# Patient Record
Sex: MALE | Race: ASIAN | NOT HISPANIC OR LATINO
[De-identification: names, ages, dates, MRNs, and addresses within clinical notes are randomized per-mention and may not be internally consistent; named-entity substitution may affect disease eponyms.]

---

## 2017-01-23 PROBLEM — Z00.00 ENCOUNTER FOR PREVENTIVE HEALTH EXAMINATION: Status: ACTIVE | Noted: 2017-01-23

## 2017-01-25 ENCOUNTER — APPOINTMENT (OUTPATIENT)
Age: 52
End: 2017-01-25

## 2017-01-25 DIAGNOSIS — N18.6 END STAGE RENAL DISEASE: ICD-10-CM

## 2017-02-04 ENCOUNTER — INPATIENT (INPATIENT)
Facility: HOSPITAL | Age: 52
LOS: 3 days | Discharge: HOME | DRG: 673 | End: 2017-02-08
Attending: INTERNAL MEDICINE | Admitting: INTERNAL MEDICINE
Payer: MEDICAID

## 2017-02-04 VITALS
TEMPERATURE: 99 F | OXYGEN SATURATION: 100 % | DIASTOLIC BLOOD PRESSURE: 68 MMHG | WEIGHT: 154.98 LBS | HEART RATE: 88 BPM | HEIGHT: 71 IN | RESPIRATION RATE: 20 BRPM | SYSTOLIC BLOOD PRESSURE: 129 MMHG

## 2017-02-04 DIAGNOSIS — D64.9 ANEMIA, UNSPECIFIED: ICD-10-CM

## 2017-02-04 LAB
ALBUMIN SERPL ELPH-MCNC: 3.4 G/DL — LOW (ref 3.5–5)
ALP SERPL-CCNC: 118 U/L — SIGNIFICANT CHANGE UP (ref 40–120)
ALT FLD-CCNC: 19 U/L DA — SIGNIFICANT CHANGE UP (ref 10–60)
ANION GAP SERPL CALC-SCNC: 7 MMOL/L — SIGNIFICANT CHANGE UP (ref 5–17)
APTT BLD: 31.9 SEC — SIGNIFICANT CHANGE UP (ref 27.5–37.4)
AST SERPL-CCNC: 15 U/L — SIGNIFICANT CHANGE UP (ref 10–40)
BASOPHILS # BLD AUTO: 0.1 K/UL — SIGNIFICANT CHANGE UP (ref 0–0.2)
BASOPHILS NFR BLD AUTO: 1 % — SIGNIFICANT CHANGE UP (ref 0–2)
BILIRUB SERPL-MCNC: 0.6 MG/DL — SIGNIFICANT CHANGE UP (ref 0.2–1.2)
BLD GP AB SCN SERPL QL: SIGNIFICANT CHANGE UP
BUN SERPL-MCNC: 31 MG/DL — HIGH (ref 7–18)
CALCIUM SERPL-MCNC: 8.6 MG/DL — SIGNIFICANT CHANGE UP (ref 8.4–10.5)
CHLORIDE SERPL-SCNC: 97 MMOL/L — SIGNIFICANT CHANGE UP (ref 96–108)
CO2 SERPL-SCNC: 34 MMOL/L — HIGH (ref 22–31)
CREAT SERPL-MCNC: 4.62 MG/DL — HIGH (ref 0.5–1.3)
EOSINOPHIL # BLD AUTO: 0.6 K/UL — HIGH (ref 0–0.5)
EOSINOPHIL NFR BLD AUTO: 6.7 % — HIGH (ref 0–6)
GLUCOSE SERPL-MCNC: 149 MG/DL — HIGH (ref 70–99)
HCT VFR BLD CALC: 23.8 % — LOW (ref 39–50)
HGB BLD-MCNC: 7.7 G/DL — LOW (ref 13–17)
INR BLD: 1.05 RATIO — SIGNIFICANT CHANGE UP (ref 0.88–1.16)
LYMPHOCYTES # BLD AUTO: 2.1 K/UL — SIGNIFICANT CHANGE UP (ref 1–3.3)
LYMPHOCYTES # BLD AUTO: 24.1 % — SIGNIFICANT CHANGE UP (ref 13–44)
MCHC RBC-ENTMCNC: 30 PG — SIGNIFICANT CHANGE UP (ref 27–34)
MCHC RBC-ENTMCNC: 32.2 GM/DL — SIGNIFICANT CHANGE UP (ref 32–36)
MCV RBC AUTO: 93.1 FL — SIGNIFICANT CHANGE UP (ref 80–100)
MONOCYTES # BLD AUTO: 0.9 K/UL — SIGNIFICANT CHANGE UP (ref 0–0.9)
MONOCYTES NFR BLD AUTO: 10.8 % — SIGNIFICANT CHANGE UP (ref 2–14)
NEUTROPHILS # BLD AUTO: 4.9 K/UL — SIGNIFICANT CHANGE UP (ref 1.8–7.4)
NEUTROPHILS NFR BLD AUTO: 57.5 % — SIGNIFICANT CHANGE UP (ref 43–77)
OB PNL STL: POSITIVE
PLATELET # BLD AUTO: 214 K/UL — SIGNIFICANT CHANGE UP (ref 150–400)
POTASSIUM SERPL-MCNC: 4.9 MMOL/L — SIGNIFICANT CHANGE UP (ref 3.5–5.3)
POTASSIUM SERPL-SCNC: 4.9 MMOL/L — SIGNIFICANT CHANGE UP (ref 3.5–5.3)
PROT SERPL-MCNC: 7.1 G/DL — SIGNIFICANT CHANGE UP (ref 6–8.3)
PROTHROM AB SERPL-ACNC: 11.8 SEC — SIGNIFICANT CHANGE UP (ref 10–13.1)
RBC # BLD: 2.55 M/UL — LOW (ref 4.2–5.8)
RBC # FLD: 16.8 % — HIGH (ref 10.3–14.5)
SODIUM SERPL-SCNC: 138 MMOL/L — SIGNIFICANT CHANGE UP (ref 135–145)
WBC # BLD: 8.5 K/UL — SIGNIFICANT CHANGE UP (ref 3.8–10.5)
WBC # FLD AUTO: 8.5 K/UL — SIGNIFICANT CHANGE UP (ref 3.8–10.5)

## 2017-02-04 PROCEDURE — 99285 EMERGENCY DEPT VISIT HI MDM: CPT

## 2017-02-04 PROCEDURE — 71020: CPT | Mod: 26

## 2017-02-04 RX ORDER — SODIUM CHLORIDE 9 MG/ML
3 INJECTION INTRAMUSCULAR; INTRAVENOUS; SUBCUTANEOUS ONCE
Qty: 0 | Refills: 0 | Status: COMPLETED | OUTPATIENT
Start: 2017-02-04 | End: 2017-02-04

## 2017-02-04 RX ADMIN — SODIUM CHLORIDE 3 MILLILITER(S): 9 INJECTION INTRAMUSCULAR; INTRAVENOUS; SUBCUTANEOUS at 17:59

## 2017-02-04 NOTE — ED PROVIDER NOTE - CHPI ED SYMPTOMS POS
NAUSEA/PALPITATIONS/DIZZINESS/weakness, lightheadedness, weakness, abd pain, black stool/SHORTNESS OF BREATH

## 2017-02-04 NOTE — ED PROVIDER NOTE - NS ED MD SCRIBE ATTENDING SCRIBE SECTIONS
PAST MEDICAL/SURGICAL/SOCIAL HISTORY/HISTORY OF PRESENT ILLNESS/REVIEW OF SYSTEMS/PHYSICAL EXAM/VITAL SIGNS( Pullset)/DISPOSITION/HIV

## 2017-02-04 NOTE — ED ADULT TRIAGE NOTE - CHIEF COMPLAINT QUOTE
c/o  shortness of breath/dizziness/chest pain x 3- 4 days last dialysis today states the perma cath is hurting too bad

## 2017-02-04 NOTE — ED ADULT NURSE NOTE - OBJECTIVE STATEMENT
PT P/W SOB AND C/P X 4 DAYS -- PATIENT HAD HD TODAY PT P/W INTERMITTENT SOB AND C/P X 4 DAYS -- PATIENT HAD HD TODAY

## 2017-02-04 NOTE — ED PROVIDER NOTE - OBJECTIVE STATEMENT
52 y/o M pt w/ PMHx of ESRD, on HD ([facility]) HD 50 y/o M pt w/ PMHx of anemia, ESRD, on HD (TuThrSat) last HD today 3hours/4hours sent in from [facility] dialysis center for weakness onset today. Pt reports palpitation, lightheadedness, nausea, SOB, mild abd pain and black stool for 3-4 weeks. Pt reports he has been feeling weak for the 3-4 days, w/ the associated sx. Pt additionally reports his BP has also have been fluctuating today while getting dialysis, dropping it to 82/26. Pt was not longer able to continue dialysis today. Pt also states that he had workup done few days ago which was significant for low H&H. Pt denies CP, fever, chills, or any other complaints. NKDA.

## 2017-02-04 NOTE — ED PROVIDER NOTE - MEDICAL DECISION MAKING DETAILS
50 y/o M pt on dialysis, sent by dialysis center for low RBC count. Symptomatic anemia. Will get blood work, likely admit if need for transfusion.

## 2017-02-05 DIAGNOSIS — I25.10 ATHEROSCLEROTIC HEART DISEASE OF NATIVE CORONARY ARTERY WITHOUT ANGINA PECTORIS: ICD-10-CM

## 2017-02-05 DIAGNOSIS — I10 ESSENTIAL (PRIMARY) HYPERTENSION: ICD-10-CM

## 2017-02-05 DIAGNOSIS — Z29.9 ENCOUNTER FOR PROPHYLACTIC MEASURES, UNSPECIFIED: ICD-10-CM

## 2017-02-05 DIAGNOSIS — D64.9 ANEMIA, UNSPECIFIED: ICD-10-CM

## 2017-02-05 DIAGNOSIS — N18.6 END STAGE RENAL DISEASE: ICD-10-CM

## 2017-02-05 LAB
CK MB BLD-MCNC: 2.9 % — SIGNIFICANT CHANGE UP (ref 0–3.5)
CK MB CFR SERPL CALC: 1.6 NG/ML — SIGNIFICANT CHANGE UP (ref 0–3.6)
CK SERPL-CCNC: 56 U/L — SIGNIFICANT CHANGE UP (ref 35–232)
HCT VFR BLD CALC: 31.9 % — LOW (ref 39–50)
HGB BLD-MCNC: 10.6 G/DL — LOW (ref 13–17)
MCHC RBC-ENTMCNC: 30.4 PG — SIGNIFICANT CHANGE UP (ref 27–34)
MCHC RBC-ENTMCNC: 33 GM/DL — SIGNIFICANT CHANGE UP (ref 32–36)
MCV RBC AUTO: 92 FL — SIGNIFICANT CHANGE UP (ref 80–100)
OB PNL STL: NEGATIVE — SIGNIFICANT CHANGE UP
PLATELET # BLD AUTO: 208 K/UL — SIGNIFICANT CHANGE UP (ref 150–400)
RBC # BLD: 3.47 M/UL — LOW (ref 4.2–5.8)
RBC # FLD: 16 % — HIGH (ref 10.3–14.5)
TROPONIN I SERPL-MCNC: 0.02 NG/ML — SIGNIFICANT CHANGE UP (ref 0–0.04)
WBC # BLD: 8 K/UL — SIGNIFICANT CHANGE UP (ref 3.8–10.5)
WBC # FLD AUTO: 8 K/UL — SIGNIFICANT CHANGE UP (ref 3.8–10.5)

## 2017-02-05 RX ORDER — DOCUSATE SODIUM 100 MG
100 CAPSULE ORAL
Qty: 0 | Refills: 0 | Status: DISCONTINUED | OUTPATIENT
Start: 2017-02-05 | End: 2017-02-08

## 2017-02-05 RX ORDER — ISOSORBIDE MONONITRATE 60 MG/1
30 TABLET, EXTENDED RELEASE ORAL DAILY
Qty: 0 | Refills: 0 | Status: DISCONTINUED | OUTPATIENT
Start: 2017-02-05 | End: 2017-02-07

## 2017-02-05 RX ORDER — LABETALOL HCL 100 MG
200 TABLET ORAL
Qty: 0 | Refills: 0 | Status: DISCONTINUED | OUTPATIENT
Start: 2017-02-05 | End: 2017-02-07

## 2017-02-05 RX ORDER — LABETALOL HCL 100 MG
200 TABLET ORAL
Qty: 0 | Refills: 0 | Status: DISCONTINUED | OUTPATIENT
Start: 2017-02-05 | End: 2017-02-05

## 2017-02-05 RX ORDER — NIFEDIPINE 30 MG
60 TABLET, EXTENDED RELEASE 24 HR ORAL ONCE
Qty: 0 | Refills: 0 | Status: COMPLETED | OUTPATIENT
Start: 2017-02-05 | End: 2017-02-05

## 2017-02-05 RX ORDER — ASPIRIN/CALCIUM CARB/MAGNESIUM 324 MG
81 TABLET ORAL DAILY
Qty: 0 | Refills: 0 | Status: DISCONTINUED | OUTPATIENT
Start: 2017-02-05 | End: 2017-02-08

## 2017-02-05 RX ORDER — LOSARTAN POTASSIUM 100 MG/1
50 TABLET, FILM COATED ORAL DAILY
Qty: 0 | Refills: 0 | Status: DISCONTINUED | OUTPATIENT
Start: 2017-02-05 | End: 2017-02-07

## 2017-02-05 RX ORDER — DOCUSATE SODIUM 100 MG
1 CAPSULE ORAL
Qty: 0 | Refills: 0 | COMMUNITY

## 2017-02-05 RX ORDER — AMLODIPINE BESYLATE 2.5 MG/1
10 TABLET ORAL DAILY
Qty: 0 | Refills: 0 | Status: DISCONTINUED | OUTPATIENT
Start: 2017-02-05 | End: 2017-02-05

## 2017-02-05 RX ORDER — LABETALOL HCL 100 MG
10 TABLET ORAL ONCE
Qty: 0 | Refills: 0 | Status: COMPLETED | OUTPATIENT
Start: 2017-02-05 | End: 2017-02-05

## 2017-02-05 RX ORDER — ERYTHROPOIETIN 10000 [IU]/ML
4000 INJECTION, SOLUTION INTRAVENOUS; SUBCUTANEOUS EVERY OTHER DAY
Qty: 0 | Refills: 0 | Status: DISCONTINUED | OUTPATIENT
Start: 2017-02-05 | End: 2017-02-05

## 2017-02-05 RX ORDER — ERYTHROPOIETIN 10000 [IU]/ML
4000 INJECTION, SOLUTION INTRAVENOUS; SUBCUTANEOUS
Qty: 0 | Refills: 0 | Status: DISCONTINUED | OUTPATIENT
Start: 2017-02-05 | End: 2017-02-06

## 2017-02-05 RX ORDER — ATORVASTATIN CALCIUM 80 MG/1
40 TABLET, FILM COATED ORAL AT BEDTIME
Qty: 0 | Refills: 0 | Status: DISCONTINUED | OUTPATIENT
Start: 2017-02-05 | End: 2017-02-08

## 2017-02-05 RX ADMIN — Medication 200 MILLIGRAM(S): at 17:12

## 2017-02-05 RX ADMIN — Medication 100 MILLIGRAM(S): at 17:12

## 2017-02-05 RX ADMIN — Medication 1 TABLET(S): at 17:12

## 2017-02-05 RX ADMIN — ISOSORBIDE MONONITRATE 30 MILLIGRAM(S): 60 TABLET, EXTENDED RELEASE ORAL at 11:41

## 2017-02-05 RX ADMIN — Medication 100 MILLIGRAM(S): at 05:57

## 2017-02-05 RX ADMIN — Medication 10 MILLIGRAM(S): at 03:52

## 2017-02-05 RX ADMIN — Medication 60 MILLIGRAM(S): at 08:43

## 2017-02-05 RX ADMIN — LOSARTAN POTASSIUM 50 MILLIGRAM(S): 100 TABLET, FILM COATED ORAL at 02:36

## 2017-02-05 RX ADMIN — Medication 1 TABLET(S): at 05:57

## 2017-02-05 RX ADMIN — ATORVASTATIN CALCIUM 40 MILLIGRAM(S): 80 TABLET, FILM COATED ORAL at 21:28

## 2017-02-05 RX ADMIN — Medication 200 MILLIGRAM(S): at 02:36

## 2017-02-05 RX ADMIN — Medication 200 MILLIGRAM(S): at 05:57

## 2017-02-05 NOTE — ED ADULT NURSE REASSESSMENT NOTE - NS ED NURSE REASSESS COMMENT FT1
NURSES NOTE --   PATIENTS B/P ELEVATED DR BRAR MADE AWARE..  MEDICATIONS GIVEN PER ORDER . WILL CONTINUE TO MONITOR B/P
UPDATED JESUS PETE ON [address]: 2 ND  TRANSFUSION STARTED AND B/P /88
spoke with dr de la garza b/p continues to be elevated and prbcs are in progress . will inform jamia esqueda  of same
1 UNITS PRBCS GIVEN. UNIT # [id number] NO REACTIONS NOTED .VSS

## 2017-02-05 NOTE — H&P ADULT. - PROBLEM SELECTOR PLAN 3
patient takes losartan and labetalol , stated stopped taking procardia but pt required labtalol INP 10 mgx2   Will start on procardia XL 60mg

## 2017-02-05 NOTE — H&P ADULT. - PROBLEM SELECTOR PLAN 1
likely anemia of chronic disease from ESRD  guaiac positive ( also on iron tabs ) , normal colonoscopy in 2014   Receiving one Unit PRBC   Monitor CBC   Will place on clear diet , if H/H remain stable , advance diet

## 2017-02-05 NOTE — H&P ADULT. - ASSESSMENT
52 y/o M w/ PMHx of CAD x2 stents (2012 on plavix , on hold since yesterday)anemia, ESRD, on HD (Tu-Thr-Sat) through left PermCath , last HD today 3hours/4hours [facility]. As per patient he has been feeling week and SOB upon walking few steps . He has baseline anemia of 7.8 since december when he was started on HD but he reported feeling generalized weakness for past  couple of weeks, also complaints of occasional palpitation. He also stated having black stools occasionally even prior to be started on iron tabs. His last colonoscopy was in 2014 and it was normal as per patient. Denied any heart burn or GERD like symptoms.   Patient symptoms of malaise, generalized weakness likely secondary to anemia .   Patient has history of anemia likely anemia of chronic disease, he is guaiac positive ( also on iron tabs ) , normal colonoscopy in 2014

## 2017-02-05 NOTE — PROVIDER CONTACT NOTE (OTHER) - SITUATION
pt with ESRD on dialysis. Bp after completing 2nd unit of blood was 204/86.labetalol ordered by . but labetalol Iv is not given on the floor.Bp rechecked. pt with ESRD on dialysis. Bp after completing 2nd unit of blood was 204/86.labetalol ordered by Dr. castañeda. but labetalol Iv is not given on the floor. Bp rechecked 196/94.

## 2017-02-05 NOTE — PROVIDER CONTACT NOTE (OTHER) - SITUATION
pt with /86,Hr-68.pt asymptomatic. pt with /86,Hr-68.pt asymptomatic.pt with ESRD,2nd unit of blood just finished.

## 2017-02-05 NOTE — H&P ADULT. - HISTORY OF PRESENT ILLNESS
50 y/o M w/ PMHx of CAD x2 stents (2012 on plavix , on hold since yesterday)anemia, ESRD, on HD (Tu-Thr-Sat) through left PermCath , last HD today 3hours/4hours sent in from [facility] dialysis center for weakness. As per patient he has been feeling week and SOB upon walking few steps . He has baseline anemia of 7.8 since december when he was started on HD but he reported feeling generalized weakness for past  couple of weeks, also complaints of occasional palpitation. He also stated having black stools occasionally even prior to be started on iron tabs. His last colonoscopy was in 2014 and it was normal as per patient. Denied any heart burn or GERD like symptoms.   ROS negative for fever, chills, headache, chest pain, abdominal pain, constipation , diarrhea.  Patient is scheduled for AVF formation on Monday with Dr. Pryor.

## 2017-02-06 ENCOUNTER — APPOINTMENT (OUTPATIENT)
Dept: VASCULAR SURGERY | Facility: HOSPITAL | Age: 52
End: 2017-02-06

## 2017-02-06 LAB
ANION GAP SERPL CALC-SCNC: 11 MMOL/L — SIGNIFICANT CHANGE UP (ref 5–17)
APTT BLD: 31.8 SEC — SIGNIFICANT CHANGE UP (ref 27.5–37.4)
BUN SERPL-MCNC: 60 MG/DL — HIGH (ref 7–18)
CALCIUM SERPL-MCNC: 8.3 MG/DL — LOW (ref 8.4–10.5)
CHLORIDE SERPL-SCNC: 100 MMOL/L — SIGNIFICANT CHANGE UP (ref 96–108)
CO2 SERPL-SCNC: 26 MMOL/L — SIGNIFICANT CHANGE UP (ref 22–31)
CREAT SERPL-MCNC: 8.16 MG/DL — HIGH (ref 0.5–1.3)
GLUCOSE SERPL-MCNC: 98 MG/DL — SIGNIFICANT CHANGE UP (ref 70–99)
HCT VFR BLD CALC: 30.2 % — LOW (ref 39–50)
HGB BLD-MCNC: 10 G/DL — LOW (ref 13–17)
INR BLD: 1.06 RATIO — SIGNIFICANT CHANGE UP (ref 0.88–1.16)
MAGNESIUM SERPL-MCNC: 2.2 MG/DL — SIGNIFICANT CHANGE UP (ref 1.8–2.4)
MCHC RBC-ENTMCNC: 30.5 PG — SIGNIFICANT CHANGE UP (ref 27–34)
MCHC RBC-ENTMCNC: 33 GM/DL — SIGNIFICANT CHANGE UP (ref 32–36)
MCV RBC AUTO: 92.5 FL — SIGNIFICANT CHANGE UP (ref 80–100)
PHOSPHATE SERPL-MCNC: 6.1 MG/DL — HIGH (ref 2.5–4.5)
PLATELET # BLD AUTO: 202 K/UL — SIGNIFICANT CHANGE UP (ref 150–400)
POTASSIUM SERPL-MCNC: 5 MMOL/L — SIGNIFICANT CHANGE UP (ref 3.5–5.3)
POTASSIUM SERPL-SCNC: 5 MMOL/L — SIGNIFICANT CHANGE UP (ref 3.5–5.3)
PROTHROM AB SERPL-ACNC: 11.9 SEC — SIGNIFICANT CHANGE UP (ref 10–13.1)
RBC # BLD: 3.26 M/UL — LOW (ref 4.2–5.8)
RBC # FLD: 15.8 % — HIGH (ref 10.3–14.5)
SODIUM SERPL-SCNC: 137 MMOL/L — SIGNIFICANT CHANGE UP (ref 135–145)
WBC # BLD: 7.8 K/UL — SIGNIFICANT CHANGE UP (ref 3.8–10.5)
WBC # FLD AUTO: 7.8 K/UL — SIGNIFICANT CHANGE UP (ref 3.8–10.5)

## 2017-02-06 RX ORDER — ERYTHROPOIETIN 10000 [IU]/ML
4000 INJECTION, SOLUTION INTRAVENOUS; SUBCUTANEOUS
Qty: 0 | Refills: 0 | Status: DISCONTINUED | OUTPATIENT
Start: 2017-02-07 | End: 2017-02-08

## 2017-02-06 RX ORDER — ACETAMINOPHEN WITH CODEINE 300MG-30MG
1 TABLET ORAL EVERY 4 HOURS
Qty: 0 | Refills: 0 | Status: DISCONTINUED | OUTPATIENT
Start: 2017-02-06 | End: 2017-02-08

## 2017-02-06 RX ADMIN — Medication 200 MILLIGRAM(S): at 17:04

## 2017-02-06 RX ADMIN — Medication 2 TABLET(S): at 22:28

## 2017-02-06 RX ADMIN — Medication 200 MILLIGRAM(S): at 06:05

## 2017-02-06 RX ADMIN — Medication 1 TABLET(S): at 06:05

## 2017-02-06 RX ADMIN — ISOSORBIDE MONONITRATE 30 MILLIGRAM(S): 60 TABLET, EXTENDED RELEASE ORAL at 14:02

## 2017-02-06 RX ADMIN — ATORVASTATIN CALCIUM 40 MILLIGRAM(S): 80 TABLET, FILM COATED ORAL at 22:28

## 2017-02-06 RX ADMIN — Medication 100 MILLIGRAM(S): at 06:06

## 2017-02-06 RX ADMIN — Medication 1 TABLET(S): at 17:30

## 2017-02-06 RX ADMIN — LOSARTAN POTASSIUM 50 MILLIGRAM(S): 100 TABLET, FILM COATED ORAL at 06:06

## 2017-02-06 RX ADMIN — Medication 1 TABLET(S): at 16:55

## 2017-02-06 RX ADMIN — Medication 100 MILLIGRAM(S): at 17:04

## 2017-02-07 ENCOUNTER — TRANSCRIPTION ENCOUNTER (OUTPATIENT)
Age: 52
End: 2017-02-07

## 2017-02-07 LAB
ANION GAP SERPL CALC-SCNC: 10 MMOL/L — SIGNIFICANT CHANGE UP (ref 5–17)
ANION GAP SERPL CALC-SCNC: 13 MMOL/L — SIGNIFICANT CHANGE UP (ref 5–17)
BUN SERPL-MCNC: 40 MG/DL — HIGH (ref 7–18)
BUN SERPL-MCNC: 90 MG/DL — HIGH (ref 7–18)
CALCIUM SERPL-MCNC: 7.9 MG/DL — LOW (ref 8.4–10.5)
CALCIUM SERPL-MCNC: 8.3 MG/DL — LOW (ref 8.4–10.5)
CALCIUM SERPL-MCNC: 8.4 MG/DL — SIGNIFICANT CHANGE UP (ref 8.4–10.5)
CHLORIDE SERPL-SCNC: 101 MMOL/L — SIGNIFICANT CHANGE UP (ref 96–108)
CHLORIDE SERPL-SCNC: 99 MMOL/L — SIGNIFICANT CHANGE UP (ref 96–108)
CO2 SERPL-SCNC: 24 MMOL/L — SIGNIFICANT CHANGE UP (ref 22–31)
CO2 SERPL-SCNC: 27 MMOL/L — SIGNIFICANT CHANGE UP (ref 22–31)
CREAT SERPL-MCNC: 10.5 MG/DL — HIGH (ref 0.5–1.3)
CREAT SERPL-MCNC: 6.01 MG/DL — HIGH (ref 0.5–1.3)
FERRITIN SERPL-MCNC: 368.2 NG/ML — SIGNIFICANT CHANGE UP (ref 30–400)
GLUCOSE SERPL-MCNC: 104 MG/DL — HIGH (ref 70–99)
GLUCOSE SERPL-MCNC: 106 MG/DL — HIGH (ref 70–99)
HCT VFR BLD CALC: 29 % — LOW (ref 39–50)
HGB BLD-MCNC: 9.5 G/DL — LOW (ref 13–17)
IRON SATN MFR SERPL: 19 % — LOW (ref 20–55)
IRON SATN MFR SERPL: 56 UG/DL — LOW (ref 65–170)
MAGNESIUM SERPL-MCNC: 2.3 MG/DL — SIGNIFICANT CHANGE UP (ref 1.8–2.4)
MCHC RBC-ENTMCNC: 30.6 PG — SIGNIFICANT CHANGE UP (ref 27–34)
MCHC RBC-ENTMCNC: 32.8 GM/DL — SIGNIFICANT CHANGE UP (ref 32–36)
MCV RBC AUTO: 93.4 FL — SIGNIFICANT CHANGE UP (ref 80–100)
PHOSPHATE SERPL-MCNC: 7.3 MG/DL — HIGH (ref 2.5–4.5)
PLATELET # BLD AUTO: 190 K/UL — SIGNIFICANT CHANGE UP (ref 150–400)
POTASSIUM SERPL-MCNC: 5.3 MMOL/L — SIGNIFICANT CHANGE UP (ref 3.5–5.3)
POTASSIUM SERPL-MCNC: 6.3 MMOL/L — CRITICAL HIGH (ref 3.5–5.3)
POTASSIUM SERPL-SCNC: 5.3 MMOL/L — SIGNIFICANT CHANGE UP (ref 3.5–5.3)
POTASSIUM SERPL-SCNC: 6.3 MMOL/L — CRITICAL HIGH (ref 3.5–5.3)
PTH-INTACT FLD-MCNC: 440 PG/ML — HIGH (ref 15–65)
RBC # BLD: 3.11 M/UL — LOW (ref 4.2–5.8)
RBC # FLD: 16.2 % — HIGH (ref 10.3–14.5)
SODIUM SERPL-SCNC: 136 MMOL/L — SIGNIFICANT CHANGE UP (ref 135–145)
SODIUM SERPL-SCNC: 138 MMOL/L — SIGNIFICANT CHANGE UP (ref 135–145)
TIBC SERPL-MCNC: 288 UG/DL — SIGNIFICANT CHANGE UP (ref 250–450)
UIBC SERPL-MCNC: 232 UG/DL — SIGNIFICANT CHANGE UP (ref 110–370)
WBC # BLD: 7.4 K/UL — SIGNIFICANT CHANGE UP (ref 3.8–10.5)
WBC # FLD AUTO: 7.4 K/UL — SIGNIFICANT CHANGE UP (ref 3.8–10.5)

## 2017-02-07 RX ORDER — LABETALOL HCL 100 MG
1 TABLET ORAL
Qty: 30 | Refills: 0 | OUTPATIENT
Start: 2017-02-07 | End: 2017-02-17

## 2017-02-07 RX ORDER — ISOSORBIDE MONONITRATE 60 MG/1
60 TABLET, EXTENDED RELEASE ORAL DAILY
Qty: 0 | Refills: 0 | Status: DISCONTINUED | OUTPATIENT
Start: 2017-02-07 | End: 2017-02-08

## 2017-02-07 RX ORDER — SEVELAMER CARBONATE 2400 MG/1
1600 POWDER, FOR SUSPENSION ORAL EVERY 8 HOURS
Qty: 0 | Refills: 0 | Status: DISCONTINUED | OUTPATIENT
Start: 2017-02-07 | End: 2017-02-07

## 2017-02-07 RX ORDER — LABETALOL HCL 100 MG
1 TABLET ORAL
Qty: 0 | Refills: 0 | COMMUNITY

## 2017-02-07 RX ORDER — IRON SUCROSE 20 MG/ML
100 INJECTION, SOLUTION INTRAVENOUS
Qty: 0 | Refills: 0 | Status: DISCONTINUED | OUTPATIENT
Start: 2017-02-07 | End: 2017-02-08

## 2017-02-07 RX ORDER — HYDRALAZINE HCL 50 MG
25 TABLET ORAL EVERY 8 HOURS
Qty: 0 | Refills: 0 | Status: DISCONTINUED | OUTPATIENT
Start: 2017-02-07 | End: 2017-02-08

## 2017-02-07 RX ORDER — LOSARTAN POTASSIUM 100 MG/1
1 TABLET, FILM COATED ORAL
Qty: 0 | Refills: 0 | COMMUNITY

## 2017-02-07 RX ORDER — SEVELAMER CARBONATE 2400 MG/1
1600 POWDER, FOR SUSPENSION ORAL
Qty: 0 | Refills: 0 | Status: DISCONTINUED | OUTPATIENT
Start: 2017-02-07 | End: 2017-02-08

## 2017-02-07 RX ORDER — LABETALOL HCL 100 MG
1 TABLET ORAL
Qty: 0 | Refills: 0 | COMMUNITY
Start: 2017-02-07

## 2017-02-07 RX ORDER — LABETALOL HCL 100 MG
300 TABLET ORAL EVERY 8 HOURS
Qty: 0 | Refills: 0 | Status: DISCONTINUED | OUTPATIENT
Start: 2017-02-07 | End: 2017-02-08

## 2017-02-07 RX ADMIN — Medication 2 TABLET(S): at 22:10

## 2017-02-07 RX ADMIN — Medication 25 MILLIGRAM(S): at 22:10

## 2017-02-07 RX ADMIN — ISOSORBIDE MONONITRATE 30 MILLIGRAM(S): 60 TABLET, EXTENDED RELEASE ORAL at 11:43

## 2017-02-07 RX ADMIN — Medication 300 MILLIGRAM(S): at 22:10

## 2017-02-07 RX ADMIN — Medication 1 TABLET(S): at 12:30

## 2017-02-07 RX ADMIN — Medication 2 TABLET(S): at 14:05

## 2017-02-07 RX ADMIN — Medication 100 MILLIGRAM(S): at 05:24

## 2017-02-07 RX ADMIN — LOSARTAN POTASSIUM 50 MILLIGRAM(S): 100 TABLET, FILM COATED ORAL at 05:24

## 2017-02-07 RX ADMIN — ATORVASTATIN CALCIUM 40 MILLIGRAM(S): 80 TABLET, FILM COATED ORAL at 22:10

## 2017-02-07 RX ADMIN — Medication 2 TABLET(S): at 05:24

## 2017-02-07 RX ADMIN — Medication 100 MILLIGRAM(S): at 17:11

## 2017-02-07 RX ADMIN — Medication 200 MILLIGRAM(S): at 05:24

## 2017-02-07 RX ADMIN — Medication 300 MILLIGRAM(S): at 16:36

## 2017-02-07 RX ADMIN — IRON SUCROSE 100 MILLIGRAM(S): 20 INJECTION, SOLUTION INTRAVENOUS at 09:31

## 2017-02-07 RX ADMIN — ERYTHROPOIETIN 4000 UNIT(S): 10000 INJECTION, SOLUTION INTRAVENOUS; SUBCUTANEOUS at 08:21

## 2017-02-07 RX ADMIN — Medication 81 MILLIGRAM(S): at 11:43

## 2017-02-07 RX ADMIN — Medication 1 TABLET(S): at 11:48

## 2017-02-07 NOTE — DISCHARGE NOTE ADULT - MEDICATION SUMMARY - MEDICATIONS TO TAKE
I will START or STAY ON the medications listed below when I get home from the hospital:    isosorbide mononitrate 30 mg oral tablet, extended release  -- 1 tab(s) by mouth once a day (in the morning)  -- Indication: For CAD (coronary artery disease)    atorvastatin 40 mg oral tablet  -- 1 tab(s) by mouth once a day  -- Indication: For Hld    clopidogrel 75 mg oral tablet  -- 1 tab(s) by mouth once a day  -- Indication: For CAD (coronary artery disease)    ferrous sulfate 324 mg (65 mg elemental iron) oral delayed release tablet  -- 1 tab(s) by mouth 2 times a day  -- Indication: For Anemia    docusate sodium 100 mg oral capsule  -- 1 cap(s) by mouth 2 times a day  -- Indication: For laxative    Calcium 600+D oral tablet  -- 1 tab(s) by mouth 3 times a day  -- Indication: For Hyperparathyroidism I will START or STAY ON the medications listed below when I get home from the hospital:    isosorbide mononitrate 30 mg oral tablet, extended release  -- 1 tab(s) by mouth once a day (in the morning)  -- Indication: For HTN (hypertension)    atorvastatin 40 mg oral tablet  -- 1 tab(s) by mouth once a day  -- Indication: For CAD (coronary artery disease)    clopidogrel 75 mg oral tablet  -- 1 tab(s) by mouth once a day  -- Indication: For CAD (coronary artery disease)    labetalol 300 mg oral tablet  -- 1 tab(s) by mouth every 8 hours  -- Indication: For HTN (hypertension)    docusate sodium 100 mg oral capsule  -- 1 cap(s) by mouth 2 times a day  -- Indication: For laxative    sevelamer hydrochloride 800 mg oral tablet  -- 2 tab(s) by mouth 3 times a day (with meals)  -- Indication: For HYPERPARATHYROIDISM    hydrALAZINE 25 mg oral tablet  -- 1 tab(s) by mouth every 8 hours  -- Indication: For HTN (hypertension)    Calcium 600+D oral tablet  -- 1 tab(s) by mouth 3 times a day  -- Indication: For Hyperparathyroidism

## 2017-02-07 NOTE — DISCHARGE NOTE ADULT - CARE PLAN
Principal Discharge DX:	Symptomatic anemia  Goal:	resolution of symptoms  Instructions for follow-up, activity and diet:	likley secondary to underlying kidney disease and iron -deficency  anemia  -please take your medications as prescribed   -  occult blood in stool negative  -please follow up with  Secondary Diagnosis:	ESRD (end stage renal disease) on dialysis  Secondary Diagnosis:	CAD (coronary artery disease)  Secondary Diagnosis:	HTN (hypertension) Principal Discharge DX:	Symptomatic anemia  Goal:	resolution of symptoms  Instructions for follow-up, activity and diet:	likely secondary to underlying kidney disease and iron -deficiency  anemia  -please take your medications as prescribed  and please take venofor 100 mg i.v  with dialysis and epogen 4000 units subq with dialysis   -  occult blood in stool negative  -please follow up with  Secondary Diagnosis:	ESRD (end stage renal disease) on dialysis  Goal:	resolution of symptoms  Instructions for follow-up, activity and diet:	please continue with hemo-dialysis  please take your medications as prescribed and follow up with nephrologist as out-patient   please continue to take veno for and epogen thrice a week with dialysis  Secondary Diagnosis:	CAD (coronary artery disease)  Goal:	resolution of symptoms  Instructions for follow-up, activity and diet:	please take your medications as prescribed and follow up with cardiology as out-patient  Secondary Diagnosis:	HTN (hypertension)  Goal:	BP< 150/90  Instructions for follow-up, activity and diet:	please take your medications as prescribed and follow up with cardiology as out-patient

## 2017-02-07 NOTE — DISCHARGE NOTE ADULT - MEDICATION SUMMARY - MEDICATIONS TO STOP TAKING
I will STOP taking the medications listed below when I get home from the hospital:    losartan 50 mg oral tablet  -- 1 tab(s) by mouth once a day I will STOP taking the medications listed below when I get home from the hospital:    ferrous sulfate 324 mg (65 mg elemental iron) oral delayed release tablet  -- 1 tab(s) by mouth 2 times a day    losartan 50 mg oral tablet  -- 1 tab(s) by mouth once a day

## 2017-02-07 NOTE — DISCHARGE NOTE ADULT - MEDICATION SUMMARY - MEDICATIONS TO CHANGE
I will SWITCH the dose or number of times a day I take the medications listed below when I get home from the hospital:    labetalol 200 mg oral tablet  -- 1 tab(s) by mouth 2 times a day

## 2017-02-07 NOTE — DISCHARGE NOTE ADULT - PATIENT PORTAL LINK FT
“You can access the [facility] Patient Portal, offered by [facility], by registering with the following website: [email or web address]”

## 2017-02-07 NOTE — DISCHARGE NOTE ADULT - HOSPITAL COURSE
52 y/o M w/ PMHx of CAD x2 stents (2012 on plavix , on hold since yesterday)anemia, ESRD, on HD (Tu-Thr-Sat) through left PermCath , . He has baseline anemia of 7.8 since december when he was started on HD but he reported feeling generalized weakness and palpitations .  His last colonoscopy was in 2014 and it was normal,  patient was admitted for generalized weakness and shortness of breath and patient had hemoglobin of 7.7 during admission and transfused 2 units of PRBC and repeat hb -10.0  and occult blood negative and patient had a-v fistula placement         monitored for any tingling and paresthesis for steal syndrome and please follow with vascular as out-patient   and HTN discontinued losartan as patient is hyperkalemia and increased the dose of labetalol and please follow up with PMD within 1 week of 52 y/o M w/ PMHx of CAD x2 stents (2012 on plavix , on hold since yesterday)anemia, ESRD, on HD (Tu-Thr-Sat) through left PermCath , . He has baseline anemia of 7.8 since december when he was started on HD but he reported feeling generalized weakness and palpitations .  His last colonoscopy was in 2014 and it was normal,  patient was admitted for generalized weakness and shortness of breath and patient had hemoglobin of 7.7 during admission and transfused 2 units of PRBC and repeat hb -10.0  and occult blood negative and patient had a-v fistula placement         monitored for any tingling and paresthesis for steal syndrome and please follow with vascular as out-patient   and HTN discontinued losartan as patient is hyperkalemia and increased the dose of labetalol and please follow up with PMD within 1 week of discharge   please continue to take epogen and venofer with dialysis.patient and follow up with vascular as out-patient 50 y/o M w/ PMHx of CAD x2 stents (2012 on plavix , on hold since yesterday)anemia, ESRD, on HD (Tu-Thr-Sat) through left PermCath , . He has baseline anemia of 7.8 since december when he was started on HD but he reported feeling generalized weakness and palpitations .  His last colonoscopy was in 2014 and it was normal,  patient was admitted for generalized weakness and shortness of breath and patient had hemoglobin of 7.7 during admission and transfused 2 units of PRBC and repeat hb -10.0  and occult blood negative and patient had  left arm a-v fistula placement 2/6/17   monitored for any tingling and paresthesis for steal syndrome and please follow with vascular as out-patient   and HTN discontinued losartan as patient is hyperkalemia and increased the dose of labetalol and please follow up with PMD within 1 week of discharge   please continue to take epogen 4000 units thrice a week  and venofer 100 mg i.v thrice a week with dialysis.patient and follow up with vascular as out-patient

## 2017-02-08 VITALS
SYSTOLIC BLOOD PRESSURE: 121 MMHG | DIASTOLIC BLOOD PRESSURE: 74 MMHG | HEART RATE: 80 BPM | RESPIRATION RATE: 18 BRPM | OXYGEN SATURATION: 98 % | TEMPERATURE: 98 F

## 2017-02-08 LAB
HAV IGM SER-ACNC: SIGNIFICANT CHANGE UP
HBV CORE IGM SER-ACNC: SIGNIFICANT CHANGE UP
HBV SURFACE AG SER-ACNC: SIGNIFICANT CHANGE UP
HCV AB S/CO SERPL IA: 0.14 S/CO — SIGNIFICANT CHANGE UP
HCV AB SERPL-IMP: SIGNIFICANT CHANGE UP
TRANSFERRIN SERPL-MCNC: 209 MG/DL — SIGNIFICANT CHANGE UP (ref 200–360)

## 2017-02-08 RX ORDER — ISOSORBIDE MONONITRATE 60 MG/1
1 TABLET, EXTENDED RELEASE ORAL
Qty: 10 | Refills: 0 | OUTPATIENT
Start: 2017-02-08 | End: 2017-02-18

## 2017-02-08 RX ORDER — LABETALOL HCL 100 MG
1 TABLET ORAL
Qty: 60 | Refills: 0 | OUTPATIENT
Start: 2017-02-08 | End: 2017-02-28

## 2017-02-08 RX ORDER — CLOPIDOGREL BISULFATE 75 MG/1
1 TABLET, FILM COATED ORAL
Qty: 10 | Refills: 0 | OUTPATIENT
Start: 2017-02-08 | End: 2017-02-18

## 2017-02-08 RX ORDER — HYDRALAZINE HCL 50 MG
1 TABLET ORAL
Qty: 60 | Refills: 0 | OUTPATIENT
Start: 2017-02-08 | End: 2017-02-28

## 2017-02-08 RX ORDER — ISOSORBIDE MONONITRATE 60 MG/1
1 TABLET, EXTENDED RELEASE ORAL
Qty: 0 | Refills: 0 | COMMUNITY

## 2017-02-08 RX ORDER — SEVELAMER CARBONATE 2400 MG/1
2 POWDER, FOR SUSPENSION ORAL
Qty: 120 | Refills: 0 | OUTPATIENT
Start: 2017-02-08 | End: 2017-02-28

## 2017-02-08 RX ORDER — ATORVASTATIN CALCIUM 80 MG/1
1 TABLET, FILM COATED ORAL
Qty: 0 | Refills: 0 | COMMUNITY

## 2017-02-08 RX ORDER — ATORVASTATIN CALCIUM 80 MG/1
1 TABLET, FILM COATED ORAL
Qty: 20 | Refills: 0 | OUTPATIENT
Start: 2017-02-08 | End: 2017-02-28

## 2017-02-08 RX ORDER — CLOPIDOGREL BISULFATE 75 MG/1
1 TABLET, FILM COATED ORAL
Qty: 0 | Refills: 0 | COMMUNITY

## 2017-02-08 RX ORDER — FERROUS SULFATE 325(65) MG
1 TABLET ORAL
Qty: 0 | Refills: 0 | COMMUNITY

## 2017-02-08 RX ADMIN — ISOSORBIDE MONONITRATE 60 MILLIGRAM(S): 60 TABLET, EXTENDED RELEASE ORAL at 12:00

## 2017-02-08 RX ADMIN — Medication 2 TABLET(S): at 05:16

## 2017-02-08 RX ADMIN — Medication 25 MILLIGRAM(S): at 05:16

## 2017-02-08 RX ADMIN — Medication 81 MILLIGRAM(S): at 12:00

## 2017-02-08 RX ADMIN — Medication 100 MILLIGRAM(S): at 05:16

## 2017-02-08 RX ADMIN — Medication 300 MILLIGRAM(S): at 05:16

## 2017-02-08 RX ADMIN — SEVELAMER CARBONATE 1600 MILLIGRAM(S): 2400 POWDER, FOR SUSPENSION ORAL at 09:00

## 2017-02-08 RX ADMIN — SEVELAMER CARBONATE 1600 MILLIGRAM(S): 2400 POWDER, FOR SUSPENSION ORAL at 12:00

## 2017-02-13 DIAGNOSIS — E87.5 HYPERKALEMIA: ICD-10-CM

## 2017-02-13 DIAGNOSIS — Z99.2 DEPENDENCE ON RENAL DIALYSIS: ICD-10-CM

## 2017-02-13 DIAGNOSIS — N25.81 SECONDARY HYPERPARATHYROIDISM OF RENAL ORIGIN: ICD-10-CM

## 2017-02-13 DIAGNOSIS — Z95.5 PRESENCE OF CORONARY ANGIOPLASTY IMPLANT AND GRAFT: ICD-10-CM

## 2017-02-13 DIAGNOSIS — D63.1 ANEMIA IN CHRONIC KIDNEY DISEASE: ICD-10-CM

## 2017-02-13 DIAGNOSIS — E83.39 OTHER DISORDERS OF PHOSPHORUS METABOLISM: ICD-10-CM

## 2017-02-13 DIAGNOSIS — Z79.82 LONG TERM (CURRENT) USE OF ASPIRIN: ICD-10-CM

## 2017-02-13 DIAGNOSIS — R19.5 OTHER FECAL ABNORMALITIES: ICD-10-CM

## 2017-02-13 DIAGNOSIS — N18.6 END STAGE RENAL DISEASE: ICD-10-CM

## 2017-02-13 DIAGNOSIS — D50.9 IRON DEFICIENCY ANEMIA, UNSPECIFIED: ICD-10-CM

## 2017-02-13 DIAGNOSIS — I12.0 HYPERTENSIVE CHRONIC KIDNEY DISEASE WITH STAGE 5 CHRONIC KIDNEY DISEASE OR END STAGE RENAL DISEASE: ICD-10-CM

## 2017-02-13 DIAGNOSIS — I25.10 ATHEROSCLEROTIC HEART DISEASE OF NATIVE CORONARY ARTERY WITHOUT ANGINA PECTORIS: ICD-10-CM

## 2017-02-21 ENCOUNTER — APPOINTMENT (OUTPATIENT)
Dept: VASCULAR SURGERY | Facility: CLINIC | Age: 52
End: 2017-02-21

## 2017-02-21 ENCOUNTER — TRANSCRIPTION ENCOUNTER (OUTPATIENT)
Age: 52
End: 2017-02-21

## 2017-02-21 VITALS
HEART RATE: 60 BPM | HEIGHT: 71 IN | WEIGHT: 158.73 LBS | DIASTOLIC BLOOD PRESSURE: 78 MMHG | TEMPERATURE: 98 F | BODY MASS INDEX: 22.22 KG/M2 | SYSTOLIC BLOOD PRESSURE: 130 MMHG

## 2017-02-21 PROBLEM — Z99.2 DEPENDENCE ON RENAL DIALYSIS: Chronic | Status: ACTIVE | Noted: 2017-02-04

## 2017-03-12 PROCEDURE — 83735 ASSAY OF MAGNESIUM: CPT

## 2017-03-12 PROCEDURE — 80053 COMPREHEN METABOLIC PANEL: CPT

## 2017-03-12 PROCEDURE — 86900 BLOOD TYPING SEROLOGIC ABO: CPT

## 2017-03-12 PROCEDURE — 84484 ASSAY OF TROPONIN QUANT: CPT

## 2017-03-12 PROCEDURE — 93005 ELECTROCARDIOGRAM TRACING: CPT

## 2017-03-12 PROCEDURE — 82728 ASSAY OF FERRITIN: CPT

## 2017-03-12 PROCEDURE — P9016: CPT

## 2017-03-12 PROCEDURE — 84100 ASSAY OF PHOSPHORUS: CPT

## 2017-03-12 PROCEDURE — 99261: CPT

## 2017-03-12 PROCEDURE — 85730 THROMBOPLASTIN TIME PARTIAL: CPT

## 2017-03-12 PROCEDURE — 36430 TRANSFUSION BLD/BLD COMPNT: CPT

## 2017-03-12 PROCEDURE — 86920 COMPATIBILITY TEST SPIN: CPT

## 2017-03-12 PROCEDURE — 84466 ASSAY OF TRANSFERRIN: CPT

## 2017-03-12 PROCEDURE — 83970 ASSAY OF PARATHORMONE: CPT

## 2017-03-12 PROCEDURE — 83550 IRON BINDING TEST: CPT

## 2017-03-12 PROCEDURE — 82553 CREATINE MB FRACTION: CPT

## 2017-03-12 PROCEDURE — 99285 EMERGENCY DEPT VISIT HI MDM: CPT | Mod: 25

## 2017-03-12 PROCEDURE — 82272 OCCULT BLD FECES 1-3 TESTS: CPT

## 2017-03-12 PROCEDURE — 85027 COMPLETE CBC AUTOMATED: CPT

## 2017-03-12 PROCEDURE — 80074 ACUTE HEPATITIS PANEL: CPT

## 2017-03-12 PROCEDURE — 71046 X-RAY EXAM CHEST 2 VIEWS: CPT

## 2017-03-12 PROCEDURE — 85610 PROTHROMBIN TIME: CPT

## 2017-03-12 PROCEDURE — 82550 ASSAY OF CK (CPK): CPT

## 2017-03-12 PROCEDURE — 86901 BLOOD TYPING SEROLOGIC RH(D): CPT

## 2017-03-12 PROCEDURE — 82310 ASSAY OF CALCIUM: CPT

## 2017-03-12 PROCEDURE — 80048 BASIC METABOLIC PNL TOTAL CA: CPT

## 2017-03-12 PROCEDURE — 86850 RBC ANTIBODY SCREEN: CPT

## 2017-03-13 ENCOUNTER — APPOINTMENT (OUTPATIENT)
Dept: VASCULAR SURGERY | Facility: CLINIC | Age: 52
End: 2017-03-13

## 2017-03-13 VITALS — RESPIRATION RATE: 16 BRPM | HEIGHT: 71 IN | TEMPERATURE: 98.2 F | WEIGHT: 158 LBS | BODY MASS INDEX: 22.12 KG/M2

## 2017-03-13 VITALS — DIASTOLIC BLOOD PRESSURE: 85 MMHG | HEART RATE: 75 BPM | SYSTOLIC BLOOD PRESSURE: 158 MMHG

## 2017-03-13 DIAGNOSIS — I25.10 ATHEROSCLEROTIC HEART DISEASE OF NATIVE CORONARY ARTERY W/OUT ANGINA PECTORIS: ICD-10-CM

## 2017-03-13 DIAGNOSIS — Z86.79 PERSONAL HISTORY OF OTHER DISEASES OF THE CIRCULATORY SYSTEM: ICD-10-CM

## 2017-03-13 DIAGNOSIS — Z86.2 PERSONAL HISTORY OF DISEASES OF THE BLOOD AND BLOOD-FORMING ORGANS AND CERTAIN DISORDERS INVOLVING THE IMMUNE MECHANISM: ICD-10-CM

## 2017-03-13 DIAGNOSIS — I77.0 ARTERIOVENOUS FISTULA, ACQUIRED: ICD-10-CM

## 2017-03-13 DIAGNOSIS — Z87.891 PERSONAL HISTORY OF NICOTINE DEPENDENCE: ICD-10-CM

## 2017-03-13 RX ORDER — LABETALOL HYDROCHLORIDE 300 MG/1
300 TABLET, FILM COATED ORAL
Refills: 0 | Status: ACTIVE | COMMUNITY

## 2017-03-13 RX ORDER — ISOSORBIDE MONONITRATE 30 MG/1
30 TABLET, EXTENDED RELEASE ORAL
Refills: 0 | Status: ACTIVE | COMMUNITY

## 2017-03-13 RX ORDER — CLOPIDOGREL 75 MG/1
75 TABLET, FILM COATED ORAL
Refills: 0 | Status: ACTIVE | COMMUNITY

## 2017-03-13 RX ORDER — ATORVASTATIN CALCIUM 40 MG/1
40 TABLET, FILM COATED ORAL
Refills: 0 | Status: ACTIVE | COMMUNITY

## 2017-03-13 RX ORDER — PNV NO.95/FERROUS FUM/FOLIC AC 28MG-0.8MG
TABLET ORAL
Refills: 0 | Status: ACTIVE | COMMUNITY

## 2017-03-13 RX ORDER — DOCUSATE SODIUM 100 MG/1
100 CAPSULE, LIQUID FILLED ORAL
Refills: 0 | Status: ACTIVE | COMMUNITY

## 2017-03-13 RX ORDER — HYDRALAZINE HYDROCHLORIDE 25 MG/1
25 TABLET ORAL
Refills: 0 | Status: ACTIVE | COMMUNITY

## 2017-03-14 ENCOUNTER — APPOINTMENT (OUTPATIENT)
Dept: VASCULAR SURGERY | Facility: CLINIC | Age: 52
End: 2017-03-14

## 2017-03-16 ENCOUNTER — FORM ENCOUNTER (OUTPATIENT)
Age: 52
End: 2017-03-16

## 2017-03-17 ENCOUNTER — APPOINTMENT (OUTPATIENT)
Age: 52
End: 2017-03-17

## 2017-03-30 ENCOUNTER — FORM ENCOUNTER (OUTPATIENT)
Age: 52
End: 2017-03-30

## 2017-03-31 ENCOUNTER — APPOINTMENT (OUTPATIENT)
Age: 52
End: 2017-03-31

## 2017-04-04 ENCOUNTER — APPOINTMENT (OUTPATIENT)
Age: 52
End: 2017-04-04

## 2017-04-05 RX ORDER — LIDOCAINE AND PRILOCAINE 25; 25 MG/G; MG/G
2.5-2.5 CREAM TOPICAL
Qty: 1 | Refills: 3 | Status: ACTIVE | COMMUNITY
Start: 2017-04-05 | End: 1900-01-01

## 2017-04-07 ENCOUNTER — APPOINTMENT (OUTPATIENT)
Age: 52
End: 2017-04-07

## 2017-04-13 ENCOUNTER — FORM ENCOUNTER (OUTPATIENT)
Age: 52
End: 2017-04-13

## 2017-04-14 ENCOUNTER — APPOINTMENT (OUTPATIENT)
Age: 52
End: 2017-04-14

## 2017-10-12 NOTE — PRE-OP CHECKLIST - ISOLATION PRECAUTIONS
Problem: Patient Care Overview  Goal: Plan of Care Review  Outcome: Ongoing (interventions implemented as appropriate)  Pt free of falls and injury during the shift. Skin is CDI; VSS. Pt converted to NSR today; confirmed by EKG. POC reviewed. Pt tolerating plan of care.        none

## 2020-12-25 NOTE — ED ADULT NURSE NOTE - NEURO WDL
Alert and oriented to person, place and time, memory intact, behavior appropriate to situation, PERRL.
Normal volume, rate, productivity, spontaneity and articulation

## 2024-09-03 NOTE — DISCHARGE NOTE ADULT - PLAN OF CARE
Turk[place]  ID# [id number]    S: Pt c/o pain to RLE knee.     O:  Vital Signs Last 24 Hrs  T(C): 36.6 (02 Sep 2024 05:56), Max: 36.7 (01 Sep 2024 20:36)  T(F): 97.9 (02 Sep 2024 05:56), Max: 98.1 (01 Sep 2024 20:36)  HR: 59 (02 Sep 2024 05:56) (59 - 77)  BP: 115/72 (02 Sep 2024 05:56) (115/72 - 139/94)  BP(mean): 86 (02 Sep 2024 05:56) (86 - 103)  RR: 17 (02 Sep 2024 05:56) (16 - 18)  SpO2: 96% (02 Sep 2024 05:56) (96% - 96%)    Parameters below as of 02 Sep 2024 05:56  Patient On (Oxygen Delivery Method): room air      Exam:  Gen: awake, alert, NAD  Resp: nonlabored  Extremities: RLE lateral knee with open ulcer with small amount of purulent tissue; surrounding erythema     Culture - Joint (08.31.24 @ 11:40)    Gram Stain:   No polymorphonuclear cells seen per low power field  No organisms seen per oil power field   Specimen Source: Knee Knee - Right   Culture Results:   Few Staphylococcus aureus     INTERVAL HPI/OVERNIGHT EVENTS:  Pt seen and examined at bedside. Patient reports having pain at the right leg around the incision area.     Vital Signs Last 24 Hrs  T(C): 36.4 (03 Sep 2024 05:15), Max: 37.1 (02 Sep 2024 15:06)  T(F): 97.5 (03 Sep 2024 05:15), Max: 98.7 (02 Sep 2024 15:06)  HR: 68 (03 Sep 2024 05:15) (64 - 84)  BP: 111/73 (03 Sep 2024 05:15) (111/73 - 124/73)  BP(mean): 86 (03 Sep 2024 05:15) (75 - 90)  RR: 17 (03 Sep 2024 05:15) (14 - 20)  SpO2: 97% (03 Sep 2024 05:15) (96% - 100%)    Parameters below as of 03 Sep 2024 05:15  Patient On (Oxygen Delivery Method): room air      I&O's Detail    02 Sep 2024 07:01  -  03 Sep 2024 07:00  --------------------------------------------------------  IN:    Lactated Ringers Bolus: 650 mL  Total IN: 650 mL    OUT:    Voided (mL): 250 mL  Total OUT: 250 mL    Total NET: 400 mL          Medications:  vancomycin  IVPB 1000 milliGRAM(s) IV Intermittent every 12 hours      Physical Exam:  General: AAOx3, No acute distress  HEENT: NC/AT, trachea midline  Respiratory: Nonlabored breathing, equal chest rise b/l   RLE: Lateral wound partially closed with packing removed. Repacked with gauze, and dressed with herminia and tape. Wound well healing without any purulent drainage or any surrounding erythema, no signs of surrounding infection.     Drains/Tubes:     09-02-24 @ 07:01  -  09-03-24 @ 07:00  --------------------------------------------------------  IN: 650 mL / OUT: 250 mL / NET: 400 mL        Labs:                        17.2   7.12  )-----------( 245      ( 03 Sep 2024 05:52 )             49.3     09-03    141  |  109<H>  |  13  ----------------------------<  100<H>  3.8   |  27  |  1.03    Ca    8.7      03 Sep 2024 05:52    TPro  7.8  /  Alb  3.6  /  TBili  0.5  /  DBili  x   /  AST  18  /  ALT  32  /  AlkPhos  69  09-01           resolution of symptoms likley secondary to underlying kidney disease and iron -deficency  anemia  -please take your medications as prescribed   -  occult blood in stool negative  -please follow up with please continue with hemo-dialysis  please take your medications as prescribed and follow up with nephrologist as out-patient   please continue to take veno for and epogen thrice a week with dialysis please take your medications as prescribed and follow up with cardiology as out-patient BP< 150/90 likely secondary to underlying kidney disease and iron -deficiency  anemia  -please take your medications as prescribed  and please take venofor 100 mg i.v  with dialysis and epogen 4000 units subq with dialysis   -  occult blood in stool negative  -please follow up with